# Patient Record
Sex: MALE | Race: OTHER | URBAN - METROPOLITAN AREA
[De-identification: names, ages, dates, MRNs, and addresses within clinical notes are randomized per-mention and may not be internally consistent; named-entity substitution may affect disease eponyms.]

---

## 2019-05-13 NOTE — PATIENT DISCUSSION
This visual field clearly demonstrated a minimum of 55% loss of upper field of vision OU, with upper lid skin in repose and elevated by taping of the lid to demonstrate potential correction. This field shows that taping the lids significantly improved this patient's superior field of vision by approximately 55%, OU.

## 2019-08-06 NOTE — PATIENT DISCUSSION
Recommend Bilateral upper lid blepharoplasty. predeterm (discussed risks and benefits of sx. ..). fat to be taken out n/c per [facility].

## 2019-08-06 NOTE — PROCEDURE NOTE: SURGICAL
"<span style=""font-weight: bold;"">MR #:&nbsp;</span> [id number]<PQ />  <br /> <span style=""font-weight: bold;""> PREOPERATIVE DIAGNOSIS:&nbsp;</span> Dermatochalasis upper lids<br />  <br /> <span style=""font-weight: bold;""> POSTOPERATIVE DIAGNOSIS:&nbsp;</span> Same<br />  <br /> <span style=""font-weight: bold;""> OPERATIVE PROCEDURE:</span>&nbsp;  Upper lid blepharoplasty with fat removal

## 2019-08-13 NOTE — PATIENT DISCUSSION
One week post op: great curve and shape, no infection, healing well, sutures intact and removed, use erythromycin QHS to upper eyelids x 2 more weeks. Use Vitamin E oil/Skinuva QHS x 1 month to incisions after 14days. Wear sunglasses and hat while outdoors. Avoid sun exposure. No restrictions in 5 days.

## 2022-05-26 ENCOUNTER — EMERGENCY VISIT (OUTPATIENT)
Dept: URBAN - METROPOLITAN AREA CLINIC 10 | Facility: CLINIC | Age: 67
End: 2022-05-26

## 2022-05-26 DIAGNOSIS — H47.233: ICD-10-CM

## 2022-05-26 DIAGNOSIS — H43.813: ICD-10-CM

## 2022-05-26 PROCEDURE — 92015 DETERMINE REFRACTIVE STATE: CPT

## 2022-05-26 PROCEDURE — 92014 COMPRE OPH EXAM EST PT 1/>: CPT

## 2022-05-26 ASSESSMENT — VISUAL ACUITY
OD_SC: 20/40+1
OS_CC: J1
OU_SC: 20/40+2
OS_SC: J7-1
OU_CC: J1
OD_SC: J10
OS_SC: 20/40
OD_CC: J1
OU_SC: J7

## 2022-05-26 ASSESSMENT — KERATOMETRY
OD_K2POWER_DIOPTERS: 43.00
OS_K1POWER_DIOPTERS: 42.00
OS_K2POWER_DIOPTERS: 42.50
OD_AXISANGLE_DEGREES: 180
OD_AXISANGLE2_DEGREES: 90
OD_K1POWER_DIOPTERS: 42.75
OS_AXISANGLE2_DEGREES: 40
OS_AXISANGLE_DEGREES: 130

## 2022-05-26 ASSESSMENT — TONOMETRY
OS_IOP_MMHG: 17
OD_IOP_MMHG: 15

## 2022-06-24 ENCOUNTER — PREPPED CHART (OUTPATIENT)
Dept: URBAN - METROPOLITAN AREA CLINIC 7 | Facility: CLINIC | Age: 67
End: 2022-06-24

## 2022-07-03 RX ORDER — PREDNISONE 20 MG/1
TABLET ORAL
COMMUNITY

## 2022-07-03 RX ORDER — FAMOTIDINE 20 MG/1
TABLET, FILM COATED ORAL
COMMUNITY

## 2022-07-03 RX ORDER — HYDROCHLOROTHIAZIDE 25 MG/1
TABLET ORAL
COMMUNITY